# Patient Record
Sex: FEMALE | Race: WHITE | NOT HISPANIC OR LATINO | Employment: UNEMPLOYED | URBAN - METROPOLITAN AREA
[De-identification: names, ages, dates, MRNs, and addresses within clinical notes are randomized per-mention and may not be internally consistent; named-entity substitution may affect disease eponyms.]

---

## 2019-12-09 PROBLEM — F43.10 PTSD (POST-TRAUMATIC STRESS DISORDER): Status: ACTIVE | Noted: 2019-12-09

## 2019-12-09 PROBLEM — E87.6 HYPOKALEMIA: Status: ACTIVE | Noted: 2019-12-09

## 2019-12-09 PROBLEM — F32.9 MAJOR DEPRESSION: Chronic | Status: ACTIVE | Noted: 2019-12-09

## 2019-12-09 PROBLEM — I10 ESSENTIAL HYPERTENSION: Chronic | Status: ACTIVE | Noted: 2019-12-09

## 2019-12-09 PROBLEM — M85.80 OSTEOPENIA: Status: ACTIVE | Noted: 2019-12-09

## 2019-12-27 PROBLEM — M17.11 PRIMARY OSTEOARTHRITIS OF RIGHT KNEE: Status: ACTIVE | Noted: 2017-06-01

## 2019-12-27 PROBLEM — Z96.653 HISTORY OF TOTAL BILATERAL KNEE REPLACEMENT: Status: ACTIVE | Noted: 2019-12-27

## 2019-12-27 PROBLEM — F12.90 MARIJUANA USE: Status: ACTIVE | Noted: 2019-02-18

## 2019-12-27 PROBLEM — M85.859 OSTEOPENIA OF HIP: Status: ACTIVE | Noted: 2019-01-01

## 2019-12-27 PROBLEM — E87.6 HYPOKALEMIA: Status: ACTIVE | Noted: 2019-06-26

## 2020-03-03 PROBLEM — R10.13 EPIGASTRIC PAIN: Status: ACTIVE | Noted: 2020-03-03

## 2020-04-08 PROBLEM — E87.6 HYPOKALEMIA: Status: RESOLVED | Noted: 2019-06-26 | Resolved: 2020-04-08

## 2020-06-09 PROBLEM — D12.8 TUBULAR ADENOMA POLYP OF RECTUM: Status: RESOLVED | Noted: 2020-06-09 | Resolved: 2020-06-09

## 2020-06-09 PROBLEM — D12.8 TUBULAR ADENOMA POLYP OF RECTUM: Status: ACTIVE | Noted: 2020-06-09

## 2020-08-29 DIAGNOSIS — U07.1 COVID-19 VIRUS DETECTED: ICD-10-CM

## 2020-09-01 ENCOUNTER — NURSE TRIAGE (OUTPATIENT)
Dept: ADMINISTRATIVE | Facility: CLINIC | Age: 60
End: 2020-09-01

## 2020-09-01 NOTE — TELEPHONE ENCOUNTER
Reason for Disposition   [1] Caller requesting NON-URGENT health information AND [2] PCP's office is the best resource    Additional Information   Negative: [1] Caller is not with the adult (patient) AND [2] reporting urgent symptoms   Negative: Lab result questions   Negative: Medication questions   Negative: Caller can't be reached by phone   Negative: Caller has already spoken to PCP or another triager   Negative: RN needs further essential information from caller in order to complete triage    Protocols used: INFORMATION ONLY CALL - NO TRIAGE-A-

## 2020-09-01 NOTE — TELEPHONE ENCOUNTER
Patient is calling, states her surgery was canceled due to a positive Covid 19 test. States she went an took a blood test at [facility] in [place], the results were negative. She is upset as she said no on has called her from Dr. Terrell's office regarding the next steps. She also says she is getting different information re how soon she can get tested again.Validated all concerns, will send message to Dr. Terrell informing him of her concerns. No further questions or concerns at this time.

## 2020-09-16 PROBLEM — R10.13 EPIGASTRIC PAIN: Status: RESOLVED | Noted: 2020-03-03 | Resolved: 2020-09-16

## 2020-09-22 PROBLEM — Z51.89 VISIT FOR WOUND CHECK: Status: ACTIVE | Noted: 2020-09-22

## 2020-09-22 PROBLEM — L03.90 CELLULITIS: Status: ACTIVE | Noted: 2020-09-22

## 2020-09-28 ENCOUNTER — NURSE TRIAGE (OUTPATIENT)
Dept: ADMINISTRATIVE | Facility: CLINIC | Age: 60
End: 2020-09-28

## 2020-09-28 NOTE — TELEPHONE ENCOUNTER
Reason for Disposition   Health Information question, no triage required and triager able to answer question    Additional Information   Negative: [1] Caller is not with the adult (patient) AND [2] reporting urgent symptoms   Negative: Lab result questions   Negative: Medication questions   Negative: Caller can't be reached by phone   Negative: Caller has already spoken to PCP or another triager   Negative: RN needs further essential information from caller in order to complete triage   Negative: Requesting regular office appointment   Negative: [1] Caller requesting NON-URGENT health information AND [2] PCP's office is the best resource    Protocols used: INFORMATION ONLY CALL - NO TRIAGE-A-

## 2020-09-28 NOTE — TELEPHONE ENCOUNTER
Contacted patient on behalf of [facility]'s post procedure symptom tracker.  Patient denies any Covid 19 symptoms

## 2021-01-25 PROBLEM — L03.90 CELLULITIS: Status: RESOLVED | Noted: 2020-09-22 | Resolved: 2021-01-25

## 2021-02-12 PROBLEM — Z01.818 PRE-OP EVALUATION: Status: ACTIVE | Noted: 2021-02-12

## 2021-03-23 PROBLEM — Z98.890 HISTORY OF ESOPHAGOGASTRODUODENOSCOPY (EGD): Status: ACTIVE | Noted: 2021-03-23

## 2021-03-23 PROBLEM — K57.30 DIVERTICULOSIS OF COLON: Status: ACTIVE | Noted: 2021-03-23

## 2021-03-23 PROBLEM — K29.50 MILD CHRONIC GASTRITIS: Status: ACTIVE | Noted: 2021-03-23

## 2021-03-23 PROBLEM — Z98.890 STATUS POST COLONOSCOPY: Status: ACTIVE | Noted: 2021-03-23

## 2021-03-23 PROBLEM — Z71.2 ENCOUNTER TO DISCUSS COLONOSCOPY RESULTS: Status: ACTIVE | Noted: 2021-03-23

## 2021-03-23 PROBLEM — J33.8 ANTRAL (MAXILLARY) POLYP: Status: ACTIVE | Noted: 2021-03-23

## 2021-03-23 PROBLEM — Z90.49 STATUS POST CHOLECYSTECTOMY: Status: ACTIVE | Noted: 2021-03-23

## 2021-08-20 PROBLEM — R06.02 SHORTNESS OF BREATH: Status: ACTIVE | Noted: 2021-08-20

## 2021-08-20 PROBLEM — R07.9 CHEST PAIN: Status: ACTIVE | Noted: 2021-08-20

## 2021-08-27 PROBLEM — D48.5 NEOPLASM OF UNCERTAIN BEHAVIOR OF SKIN: Status: ACTIVE | Noted: 2021-08-27

## 2021-11-24 PROBLEM — R07.9 CHEST PAIN: Status: RESOLVED | Noted: 2021-08-20 | Resolved: 2021-11-24

## 2021-11-24 PROBLEM — R06.02 SHORTNESS OF BREATH: Status: RESOLVED | Noted: 2021-08-20 | Resolved: 2021-11-24
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
## 2021-12-03 PROBLEM — M25.531 RIGHT WRIST PAIN: Status: ACTIVE | Noted: 2021-12-03

## 2022-02-08 PROBLEM — M65.311 TRIGGER THUMB OF RIGHT HAND: Status: ACTIVE | Noted: 2022-02-08

## 2023-02-02 PROBLEM — G89.29 CHRONIC BILATERAL LOW BACK PAIN: Status: ACTIVE | Noted: 2023-02-02

## 2023-02-02 PROBLEM — M54.50 CHRONIC BILATERAL LOW BACK PAIN: Status: ACTIVE | Noted: 2023-02-02

## 2023-02-02 PROBLEM — F32.A DEPRESSION: Status: ACTIVE | Noted: 2019-12-09

## 2023-02-02 PROBLEM — E66.9 OBESITY (BMI 30-39.9): Status: ACTIVE | Noted: 2023-02-02

## 2023-05-12 PROBLEM — K21.9 GASTROESOPHAGEAL REFLUX DISEASE: Status: ACTIVE | Noted: 2023-05-12

## 2024-08-21 DIAGNOSIS — I10 ESSENTIAL HYPERTENSION: ICD-10-CM
